# Patient Record
Sex: MALE | Race: WHITE | NOT HISPANIC OR LATINO | Employment: FULL TIME | URBAN - METROPOLITAN AREA
[De-identification: names, ages, dates, MRNs, and addresses within clinical notes are randomized per-mention and may not be internally consistent; named-entity substitution may affect disease eponyms.]

---

## 2023-05-03 ENCOUNTER — TELEPHONE (OUTPATIENT)
Dept: PRIMARY CARE | Facility: CLINIC | Age: 38
End: 2023-05-03

## 2023-05-03 DIAGNOSIS — F32.5 MAJOR DEPRESSIVE DISORDER IN FULL REMISSION, UNSPECIFIED WHETHER RECURRENT (CMS-HCC): ICD-10-CM

## 2023-05-03 DIAGNOSIS — F32.5 MAJOR DEPRESSIVE DISORDER IN FULL REMISSION, UNSPECIFIED WHETHER RECURRENT (CMS-HCC): Primary | ICD-10-CM

## 2023-05-03 RX ORDER — DULOXETIN HYDROCHLORIDE 30 MG/1
30 CAPSULE, DELAYED RELEASE ORAL DAILY
Qty: 90 CAPSULE | Refills: 3 | Status: SHIPPED | OUTPATIENT
Start: 2023-05-03 | End: 2023-05-03 | Stop reason: SDUPTHER

## 2023-05-03 RX ORDER — DULOXETIN HYDROCHLORIDE 30 MG/1
30 CAPSULE, DELAYED RELEASE ORAL DAILY
Qty: 90 CAPSULE | Refills: 3 | Status: SHIPPED | OUTPATIENT
Start: 2023-05-03 | End: 2024-03-27 | Stop reason: SDUPTHER

## 2023-05-03 NOTE — TELEPHONE ENCOUNTER
"Pt called rx line at [id number] requesting that his cymbalta rx go to another pharmacy... \"[facility]\"????    Please call pt to confirm pharmacy, med was sent to [facility]  "

## 2023-11-29 ENCOUNTER — TELEMEDICINE (OUTPATIENT)
Dept: PRIMARY CARE | Facility: CLINIC | Age: 38
End: 2023-11-29
Payer: COMMERCIAL

## 2023-11-29 DIAGNOSIS — N46.9 INFERTILITY MALE: Primary | ICD-10-CM

## 2023-11-29 PROCEDURE — 99213 OFFICE O/P EST LOW 20 MIN: CPT | Performed by: FAMILY MEDICINE

## 2023-11-29 NOTE — PROGRESS NOTES
Subjective   Patient ID: Nghia Hubbard is a 38 y.o. male who presents for No chief complaint on file..  HPI    Patient presents because he and his wife have not been on to get pregnant even though they have been trying for months.  She was on birth control and has been off it for 6-month now.  No other issues at all.    This is a videochat visit.  Obtained consent from patient to have a video chat.  Total time spent in video chat was 8 mins.    Physical exam:  GEN: well appearing patient, no acute distress  HEENT: normal appearing sclera   NEURO: CN appear intact grossly  PSYCH: answering questions appropriately, normal mood      There is no problem list on file for this patient.      Social Connections: Not on file       Current Outpatient Medications on File Prior to Visit   Medication Sig Dispense Refill    DULoxetine (Cymbalta) 30 mg DR capsule Take 1 capsule (30 mg) by mouth once daily. Do not crush or chew. 90 capsule 3     No current facility-administered medications on file prior to visit.        There were no vitals filed for this visit.  There were no vitals filed for this visit.    Review of Systems    Objective     Physical Exam    No visits with results within 2 Month(s) from this visit.   Latest known visit with results is:   No results found for any previous visit.       Assessment/Plan   Problem List Items Addressed This Visit    None  Visit Diagnoses         Codes    Infertility male    -  Primary N46.9    Relevant Orders    POCT Semen Analysis Complete with Strict Morphology          Ordering semen analysis for patient.

## 2023-12-14 ENCOUNTER — ANCILLARY PROCEDURE (OUTPATIENT)
Dept: ENDOCRINOLOGY | Facility: CLINIC | Age: 38
End: 2023-12-14
Payer: COMMERCIAL

## 2023-12-14 DIAGNOSIS — N46.9 INFERTILITY MALE: ICD-10-CM

## 2023-12-14 LAB
% EX RESIDUAL CYTOPLASM (SEMEN): 0 %
% HEAD DEFECTS (SEMEN): 96 %
% NECK MIDPIECE (SEMEN): 8.8 %
% NORMAL (SEMEN): 4.3 % (ref 4–?)
% TAIL DEFECTS (SEMEN): 5 %
ABSTINENCE (DAYS): 3 DAYS (ref 2–7)
AGGLUTINATION (SEMEN): NO
ANALYZED TIME:: NORMAL
ANDROLOGY LAB ID#: NORMAL
CLUMPS (SEMEN): YES
COLLECTED COMPLETELY: YES
COLLECTION LOCATION:: NORMAL
COLLECTION METHOD:: NORMAL
CONCENTRATION(SEMEN): 135.33 MILL/ML (ref 15–?)
DEBRIS (SEMEN): YES
LEUKOCYTE (SEMEN): NEGATIVE
NON PROG. MOTILITY (SEMEN): 2 %
PROG. MOTILITY (SEMEN): 51 % (ref 32–?)
RECEIVED TIME:: NORMAL
SEMEN APPEARANCE: NORMAL
SEMEN LIQUEFACTION: NORMAL
SEMEN VISCOSITY: NORMAL
TOT. NO OF NORM. MOTILE SPERM (SEMEN): 12.08 MILL
TOT. NO OF NORM. SPERM (SEMEN): 6.42 MILL
TOTAL MOTILITY (SEMEN): 53 % (ref 40–?)
TOTAL NO OF MOTILE (SEMEN): 151.07 MILL
TOTAL NO OF RND CELLS (SEMEN): 1.3 MILL (ref ?–5)
TOTAL NO OF SPERM (SEMEN): 284.2 MILL (ref 39–?)
VOLUME (SEMEN): 2.1 ML (ref 1.5–?)

## 2023-12-14 PROCEDURE — 89322 SEMEN ANAL STRICT CRITERIA: CPT | Performed by: OBSTETRICS & GYNECOLOGY

## 2024-03-27 ENCOUNTER — TELEPHONE (OUTPATIENT)
Dept: PRIMARY CARE | Facility: CLINIC | Age: 39
End: 2024-03-27
Payer: COMMERCIAL

## 2024-03-27 DIAGNOSIS — F32.5 MAJOR DEPRESSIVE DISORDER IN FULL REMISSION, UNSPECIFIED WHETHER RECURRENT (CMS-HCC): ICD-10-CM

## 2024-03-27 RX ORDER — DULOXETIN HYDROCHLORIDE 30 MG/1
30 CAPSULE, DELAYED RELEASE ORAL DAILY
Qty: 90 CAPSULE | Refills: 0 | Status: SHIPPED | OUTPATIENT
Start: 2024-03-27 | End: 2024-03-28 | Stop reason: SDUPTHER

## 2024-03-27 RX ORDER — BUPROPION HYDROCHLORIDE 150 MG/1
150 TABLET ORAL DAILY
Qty: 90 TABLET | Refills: 0 | Status: SHIPPED | OUTPATIENT
Start: 2024-03-27 | End: 2024-03-28 | Stop reason: SDUPTHER

## 2024-03-27 RX ORDER — BUPROPION HYDROCHLORIDE 150 MG/1
150 TABLET ORAL DAILY
COMMUNITY
Start: 2015-07-08 | End: 2024-03-27 | Stop reason: SDUPTHER

## 2024-03-27 NOTE — TELEPHONE ENCOUNTER
Pt called rx line @ 330 his Duloxetine and Wellbutrin was sent to [facility] but he's asking if they can be resent to [facility] instead. MONSE

## 2024-03-27 NOTE — TELEPHONE ENCOUNTER
Pt scheduled for 4/2 but will be out of duloxetine and bupropion (not found in chart) before appointment. Are these okay to fill before 4/2 appointment. Last ov 11/29

## 2024-03-28 DIAGNOSIS — F32.5 MAJOR DEPRESSIVE DISORDER IN FULL REMISSION, UNSPECIFIED WHETHER RECURRENT (CMS-HCC): ICD-10-CM

## 2024-03-28 RX ORDER — DULOXETIN HYDROCHLORIDE 30 MG/1
30 CAPSULE, DELAYED RELEASE ORAL DAILY
Qty: 90 CAPSULE | Refills: 0 | Status: SHIPPED | OUTPATIENT
Start: 2024-03-28 | End: 2024-04-02 | Stop reason: SDUPTHER

## 2024-03-28 RX ORDER — BUPROPION HYDROCHLORIDE 150 MG/1
150 TABLET ORAL DAILY
Qty: 90 TABLET | Refills: 0 | Status: SHIPPED | OUTPATIENT
Start: 2024-03-28 | End: 2024-04-02 | Stop reason: SDUPTHER

## 2024-03-28 NOTE — PROGRESS NOTES
Subjective   Patient ID: Nghia Hubbard is a 38 y.o. male who presents for No chief complaint on file..  HPI    There is no problem list on file for this patient.      Social Connections: Not on file       Current Outpatient Medications on File Prior to Visit   Medication Sig Dispense Refill    buPROPion XL (Wellbutrin XL) 150 mg 24 hr tablet Take 1 tablet (150 mg) by mouth early in the morning.. 90 tablet 0    DULoxetine (Cymbalta) 30 mg DR capsule Take 1 capsule (30 mg) by mouth once daily. Do not crush or chew. 90 capsule 0    [DISCONTINUED] buPROPion XL (Wellbutrin XL) 150 mg 24 hr tablet Take 1 tablet (150 mg) by mouth early in the morning..      [DISCONTINUED] DULoxetine (Cymbalta) 30 mg DR capsule Take 1 capsule (30 mg) by mouth once daily. Do not crush or chew. 90 capsule 3     No current facility-administered medications on file prior to visit.        There were no vitals filed for this visit.  There were no vitals filed for this visit.    Review of Systems    Objective     Physical Exam    No visits with results within 2 Month(s) from this visit.   Latest known visit with results is:   Ancillary Procedure on 12/14/2023   Component Date Value Ref Range Status    Andrology Lab ID# 12/14/2023 [id number]   Final    Abstinence (days) 12/14/2023 3  2 - 7 days Final    Collected Completely 12/14/2023 Yes   Final    Collection Location 12/14/2023 Center   Final    Collection Method 12/14/2023 Masturbation   Final    Received time 12/14/2023 2:00 PM   Final    Analyzed Time 12/14/2023 2:15 PM   Final    Semen Appearance 12/14/2023 Normal   Final    Semen Viscosity 12/14/2023 Abnormal   Final    Semen Liquefaction 12/14/2023 Normal   Final    Debris (Semen) 12/14/2023 Yes   Final    Clumps (Semen) 12/14/2023 Yes   Final    Agglutination (Semen) 12/14/2023 No   Final    Volume (Semen) 12/14/2023 2.1  1.5 mL Final    Concentration(Semen) 12/14/2023 135.33  15 mill/mL Final    Total Motility (Semen) 12/14/2023 53  40  % Final    Prog. Motility (Semen) 12/14/2023 51  32 % Final    Non Prog. Motility (Semen) 12/14/2023 2  % Final    Total No of Sperm (Semen) 12/14/2023 284.20  39 mill Final    Total No of Motile (Semen) 12/14/2023 151.07  mill Final    Total No of Rnd Cells (Semen) 12/14/2023 1.3  5 mill Final    Leukocyte (Semen) 12/14/2023 Negative   Final    % Normal (Semen) 12/14/2023 4.3  4 % Final    % Head defects (Semen) 12/14/2023 96.0  % Final    % Neck Midpiece (Semen) 12/14/2023 8.8  % Final    % Tail defects (Semen) 12/14/2023 5.0  % Final    % Ex Residual Cytoplasm (Semen) 12/14/2023 0.0  % Final    Tot. No of Norm. Motile Sperm (Esther* 12/14/2023 12.079  mill Final    Tot. No of Norm. Sperm (Semen) 12/14/2023 6.420  mill Final       Assessment/Plan

## 2024-04-02 ENCOUNTER — OFFICE VISIT (OUTPATIENT)
Dept: PRIMARY CARE | Facility: CLINIC | Age: 39
End: 2024-04-02
Payer: COMMERCIAL

## 2024-04-02 VITALS
DIASTOLIC BLOOD PRESSURE: 62 MMHG | WEIGHT: 195 LBS | SYSTOLIC BLOOD PRESSURE: 94 MMHG | OXYGEN SATURATION: 95 % | HEIGHT: 73 IN | BODY MASS INDEX: 25.84 KG/M2 | TEMPERATURE: 97.4 F | HEART RATE: 75 BPM

## 2024-04-02 DIAGNOSIS — Z00.00 ROUTINE ADULT HEALTH MAINTENANCE: Primary | ICD-10-CM

## 2024-04-02 DIAGNOSIS — F32.5 MAJOR DEPRESSIVE DISORDER IN FULL REMISSION, UNSPECIFIED WHETHER RECURRENT (CMS-HCC): ICD-10-CM

## 2024-04-02 DIAGNOSIS — N46.9 INFERTILITY MALE: ICD-10-CM

## 2024-04-02 PROCEDURE — 99213 OFFICE O/P EST LOW 20 MIN: CPT | Performed by: FAMILY MEDICINE

## 2024-04-02 PROCEDURE — 1036F TOBACCO NON-USER: CPT | Performed by: FAMILY MEDICINE

## 2024-04-02 PROCEDURE — 99395 PREV VISIT EST AGE 18-39: CPT | Performed by: FAMILY MEDICINE

## 2024-04-02 RX ORDER — BUPROPION HYDROCHLORIDE 150 MG/1
150 TABLET ORAL DAILY
Qty: 90 TABLET | Refills: 3 | Status: SHIPPED | OUTPATIENT
Start: 2024-04-02 | End: 2024-04-05 | Stop reason: SDUPTHER

## 2024-04-02 RX ORDER — DULOXETIN HYDROCHLORIDE 30 MG/1
30 CAPSULE, DELAYED RELEASE ORAL DAILY
Qty: 90 CAPSULE | Refills: 3 | Status: SHIPPED | OUTPATIENT
Start: 2024-04-02 | End: 2025-03-28

## 2024-04-02 NOTE — PROGRESS NOTES
Subjective   Patient ID: Nghia Hubbard is a 38 y.o. male who presents for Annual Exam.  HPI  Presents for CPE.  He is doing ok.  Mood is fair.  Struggling with infertility with his wife.  Exercising more.  Weight is down a bit.  Taking his meds.     Patient Active Problem List   Diagnosis    Depressive disorder    Generalized anxiety disorder       Social Connections: Not on file       Current Outpatient Medications on File Prior to Visit   Medication Sig Dispense Refill    buPROPion XL (Wellbutrin XL) 150 mg 24 hr tablet Take 1 tablet (150 mg) by mouth early in the morning.. 90 tablet 0    DULoxetine (Cymbalta) 30 mg DR capsule Take 1 capsule (30 mg) by mouth once daily. Do not crush or chew. 90 capsule 0    [DISCONTINUED] buPROPion XL (Wellbutrin XL) 150 mg 24 hr tablet Take 1 tablet (150 mg) by mouth early in the morning..      [DISCONTINUED] buPROPion XL (Wellbutrin XL) 150 mg 24 hr tablet Take 1 tablet (150 mg) by mouth early in the morning.. 90 tablet 0    [DISCONTINUED] DULoxetine (Cymbalta) 30 mg DR capsule Take 1 capsule (30 mg) by mouth once daily. Do not crush or chew. 90 capsule 3    [DISCONTINUED] DULoxetine (Cymbalta) 30 mg DR capsule Take 1 capsule (30 mg) by mouth once daily. Do not crush or chew. 90 capsule 0     No current facility-administered medications on file prior to visit.        Vitals:    04/02/24 1359   BP: 94/62   Pulse: 75   Temp: 36.3 °C (97.4 °F)   SpO2: 95%     Vitals:    04/02/24 1359   Weight: 88.5 kg (195 lb)       Review of Systems    Objective     Physical Exam    No visits with results within 2 Month(s) from this visit.   Latest known visit with results is:   Ancillary Procedure on 12/14/2023   Component Date Value Ref Range Status    Andrology Lab ID# 12/14/2023 [id number]   Final    Abstinence (days) 12/14/2023 3  2 - 7 days Final    Collected Completely 12/14/2023 Yes   Final    Collection Location 12/14/2023 Center   Final    Collection Method 12/14/2023 Masturbation    Final    Received time 12/14/2023 2:00 PM   Final    Analyzed Time 12/14/2023 2:15 PM   Final    Semen Appearance 12/14/2023 Normal   Final    Semen Viscosity 12/14/2023 Abnormal   Final    Semen Liquefaction 12/14/2023 Normal   Final    Debris (Semen) 12/14/2023 Yes   Final    Clumps (Semen) 12/14/2023 Yes   Final    Agglutination (Semen) 12/14/2023 No   Final    Volume (Semen) 12/14/2023 2.1  1.5 mL Final    Concentration(Semen) 12/14/2023 135.33  15 mill/mL Final    Total Motility (Semen) 12/14/2023 53  40 % Final    Prog. Motility (Semen) 12/14/2023 51  32 % Final    Non Prog. Motility (Semen) 12/14/2023 2  % Final    Total No of Sperm (Semen) 12/14/2023 284.20  39 mill Final    Total No of Motile (Semen) 12/14/2023 151.07  mill Final    Total No of Rnd Cells (Semen) 12/14/2023 1.3  5 mill Final    Leukocyte (Semen) 12/14/2023 Negative   Final    % Normal (Semen) 12/14/2023 4.3  4 % Final    % Head defects (Semen) 12/14/2023 96.0  % Final    % Neck Midpiece (Semen) 12/14/2023 8.8  % Final    % Tail defects (Semen) 12/14/2023 5.0  % Final    % Ex Residual Cytoplasm (Semen) 12/14/2023 0.0  % Final    Tot. No of Norm. Motile Sperm (Esther* 12/14/2023 12.079  mill Final    Tot. No of Norm. Sperm (Semen) 12/14/2023 6.420  mill Final       Assessment/Plan   Problem List Items Addressed This Visit    None  Visit Diagnoses         Codes    Routine adult health maintenance    -  Primary Z00.00    Relevant Orders    Lipid Panel    Comprehensive Metabolic Panel    CBC and Auto Differential    Testosterone, total and free    Major depressive disorder in full remission, unspecified whether recurrent (CMS/HCC)     F32.5    Relevant Medications    buPROPion XL (Wellbutrin XL) 150 mg 24 hr tablet    DULoxetine (Cymbalta) 30 mg DR capsule    Other Relevant Orders    Lipid Panel    Comprehensive Metabolic Panel    CBC and Auto Differential    Testosterone, total and free    Infertility male     N46.9          Refilled meds.   Take  supplements for sperm health.  Fu in 12 mo

## 2024-04-05 DIAGNOSIS — F32.5 MAJOR DEPRESSIVE DISORDER IN FULL REMISSION, UNSPECIFIED WHETHER RECURRENT (CMS-HCC): ICD-10-CM

## 2024-04-05 RX ORDER — BUPROPION HYDROCHLORIDE 150 MG/1
150 TABLET ORAL DAILY
Qty: 30 TABLET | Refills: 0 | Status: SHIPPED | OUTPATIENT
Start: 2024-04-05 | End: 2024-04-30

## 2024-04-05 NOTE — TELEPHONE ENCOUNTER
Pt called Rx line stating that he got pended medication filled through [facility] and it has not arrived yet and won't until next week. Pt is asking if med can be filled with [facility] for a short supply until he receives med, please advise. [facility] pt and pt is out.

## 2024-04-30 DIAGNOSIS — F32.5 MAJOR DEPRESSIVE DISORDER IN FULL REMISSION, UNSPECIFIED WHETHER RECURRENT (CMS-HCC): ICD-10-CM

## 2024-04-30 RX ORDER — BUPROPION HYDROCHLORIDE 150 MG/1
TABLET ORAL
Qty: 90 TABLET | Refills: 3 | Status: SHIPPED | OUTPATIENT
Start: 2024-04-30

## 2024-04-30 RX ORDER — BUPROPION HYDROCHLORIDE 150 MG/1
150 TABLET ORAL DAILY
Qty: 90 TABLET | Refills: 3 | Status: SHIPPED | OUTPATIENT
Start: 2024-04-30

## 2024-04-30 NOTE — TELEPHONE ENCOUNTER
Pt called rx line at 855a requesting a 90d rx refill on pended med    Pt has no upcoming appts  Pt compliant  Pt uses [facility]x

## 2025-03-26 ENCOUNTER — APPOINTMENT (OUTPATIENT)
Dept: ENDOCRINOLOGY | Facility: CLINIC | Age: 40
End: 2025-03-26
Payer: COMMERCIAL

## 2025-04-10 DIAGNOSIS — F32.5 MAJOR DEPRESSIVE DISORDER IN FULL REMISSION, UNSPECIFIED WHETHER RECURRENT: ICD-10-CM

## 2025-04-10 RX ORDER — BUPROPION HYDROCHLORIDE 150 MG/1
150 TABLET ORAL DAILY
Qty: 90 TABLET | Refills: 0 | Status: SHIPPED | OUTPATIENT
Start: 2025-04-10

## 2025-04-10 RX ORDER — DULOXETIN HYDROCHLORIDE 30 MG/1
30 CAPSULE, DELAYED RELEASE ORAL DAILY
Qty: 90 CAPSULE | Refills: 0 | Status: SHIPPED | OUTPATIENT
Start: 2025-04-10 | End: 2026-04-05

## 2025-04-10 NOTE — TELEPHONE ENCOUNTER
Pt will be short a few days of his rx, requesting rf.    Medication Name:   Duloxetine   Bupropion  Pharmacy Name: [facility]  Last OV: 4/2/24  Next OV: 4/24/25

## 2025-04-24 ENCOUNTER — TELEPHONE (OUTPATIENT)
Dept: PRIMARY CARE | Facility: CLINIC | Age: 40
End: 2025-04-24

## 2025-04-24 ENCOUNTER — APPOINTMENT (OUTPATIENT)
Dept: PRIMARY CARE | Facility: CLINIC | Age: 40
End: 2025-04-24
Payer: COMMERCIAL

## 2025-04-24 VITALS
OXYGEN SATURATION: 99 % | SYSTOLIC BLOOD PRESSURE: 122 MMHG | DIASTOLIC BLOOD PRESSURE: 84 MMHG | HEART RATE: 80 BPM | TEMPERATURE: 97.5 F | BODY MASS INDEX: 25.76 KG/M2 | WEIGHT: 190.2 LBS | HEIGHT: 72 IN

## 2025-04-24 DIAGNOSIS — F41.9 ANXIETY: ICD-10-CM

## 2025-04-24 DIAGNOSIS — Z00.00 ROUTINE ADULT HEALTH MAINTENANCE: Primary | ICD-10-CM

## 2025-04-24 DIAGNOSIS — F32.5 MAJOR DEPRESSIVE DISORDER IN FULL REMISSION, UNSPECIFIED WHETHER RECURRENT: ICD-10-CM

## 2025-04-24 PROCEDURE — 99213 OFFICE O/P EST LOW 20 MIN: CPT | Performed by: FAMILY MEDICINE

## 2025-04-24 PROCEDURE — 99396 PREV VISIT EST AGE 40-64: CPT | Performed by: FAMILY MEDICINE

## 2025-04-24 PROCEDURE — 3008F BODY MASS INDEX DOCD: CPT | Performed by: FAMILY MEDICINE

## 2025-04-24 PROCEDURE — 1036F TOBACCO NON-USER: CPT | Performed by: FAMILY MEDICINE

## 2025-04-24 RX ORDER — BUPROPION HYDROCHLORIDE 150 MG/1
150 TABLET ORAL DAILY
Qty: 90 TABLET | Refills: 3 | Status: SHIPPED | OUTPATIENT
Start: 2025-04-24 | End: 2025-04-24 | Stop reason: SDUPTHER

## 2025-04-24 RX ORDER — DULOXETIN HYDROCHLORIDE 30 MG/1
30 CAPSULE, DELAYED RELEASE ORAL DAILY
Qty: 90 CAPSULE | Refills: 3 | Status: SHIPPED | OUTPATIENT
Start: 2025-04-24 | End: 2025-04-24 | Stop reason: SDUPTHER

## 2025-04-24 RX ORDER — DIAZEPAM 2 MG/1
2 TABLET ORAL NIGHTLY PRN
Qty: 6 TABLET | Refills: 0 | Status: SHIPPED | OUTPATIENT
Start: 2025-04-24 | End: 2025-04-26 | Stop reason: SDUPTHER

## 2025-04-24 RX ORDER — DULOXETIN HYDROCHLORIDE 30 MG/1
30 CAPSULE, DELAYED RELEASE ORAL DAILY
Qty: 90 CAPSULE | Refills: 3 | Status: SHIPPED | OUTPATIENT
Start: 2025-04-24 | End: 2026-04-19

## 2025-04-24 RX ORDER — BUPROPION HYDROCHLORIDE 150 MG/1
150 TABLET ORAL DAILY
Qty: 90 TABLET | Refills: 3 | Status: SHIPPED | OUTPATIENT
Start: 2025-04-24

## 2025-04-24 RX ORDER — DIAZEPAM 2 MG/1
2 TABLET ORAL NIGHTLY PRN
Qty: 6 TABLET | Refills: 0 | Status: SHIPPED | OUTPATIENT
Start: 2025-04-24 | End: 2025-04-24 | Stop reason: SDUPTHER

## 2025-04-24 ASSESSMENT — PATIENT HEALTH QUESTIONNAIRE - PHQ9
SUM OF ALL RESPONSES TO PHQ9 QUESTIONS 1 AND 2: 0
2. FEELING DOWN, DEPRESSED OR HOPELESS: NOT AT ALL
1. LITTLE INTEREST OR PLEASURE IN DOING THINGS: NOT AT ALL

## 2025-04-24 NOTE — PROGRESS NOTES
Subjective   Patient ID: Nghia Hubbard is a 39 y.o. male who presents for Annual Exam (CPE).  HPI  Presents for CPE.  He is doing ok.  Mood is fair.  Struggling with infertility with his wife.  Exercising more.  Weight is down a bit.  Taking his meds.     Family History[1]      Social History     Socioeconomic History    Marital status:      Spouse name: Not on file    Number of children: Not on file    Years of education: Not on file    Highest education level: Not on file   Occupational History    Not on file   Tobacco Use    Smoking status: Never    Smokeless tobacco: Never   Substance and Sexual Activity    Alcohol use: Not on file    Drug use: Not on file    Sexual activity: Not on file   Other Topics Concern    Not on file   Social History Narrative    Not on file     Social Drivers of Health     Financial Resource Strain: Not on file   Food Insecurity: Not on file   Transportation Needs: Not on file   Physical Activity: Not on file   Stress: Not on file   Social Connections: Not on file   Intimate Partner Violence: Not on file   Housing Stability: Not on file         Patient Active Problem List   Diagnosis    Depressive disorder    Generalized anxiety disorder       Social Connections: Not on file       Current Outpatient Medications on File Prior to Visit   Medication Sig Dispense Refill    buPROPion XL (Wellbutrin XL) 150 mg 24 hr tablet Take 1 tablet (150 mg) by mouth early in the morning.. 90 tablet 0    DULoxetine (Cymbalta) 30 mg DR capsule Take 1 capsule (30 mg) by mouth once daily. Do not crush or chew. 90 capsule 0    [DISCONTINUED] buPROPion XL (Wellbutrin XL) 150 mg 24 hr tablet Take 1 tablet by mouth early in the morning. 90 tablet 3     No current facility-administered medications on file prior to visit.        Vitals:    04/24/25 0912   BP: 122/84   Pulse: 80   Temp: 36.4 °C (97.5 °F)   SpO2: 99%     Vitals:    04/24/25 0912   Weight: 86.3 kg (190 lb 3.2 oz)       Review of Systems    All other systems reviewed and are negative.      Objective     Physical Exam  Vitals reviewed.   Constitutional:       General: He is not in acute distress.     Appearance: Normal appearance. He is well-developed. He is not diaphoretic.   HENT:      Head: Normocephalic and atraumatic.      Right Ear: Tympanic membrane normal.      Left Ear: Tympanic membrane normal.      Nose: Nose normal.      Mouth/Throat:      Mouth: Mucous membranes are moist.   Eyes:      Pupils: Pupils are equal, round, and reactive to light.   Cardiovascular:      Rate and Rhythm: Normal rate and regular rhythm.      Heart sounds: Normal heart sounds. No murmur heard.     No friction rub. No gallop.   Pulmonary:      Effort: Pulmonary effort is normal.      Breath sounds: Normal breath sounds. No rales.   Abdominal:      General: Bowel sounds are normal.      Palpations: Abdomen is soft.      Tenderness: There is no abdominal tenderness.   Musculoskeletal:      Cervical back: Normal range of motion and neck supple.   Skin:     General: Skin is warm and dry.   Neurological:      Mental Status: He is alert.   Psychiatric:         Mood and Affect: Mood normal.         No visits with results within 2 Month(s) from this visit.   Latest known visit with results is:   Ancillary Procedure on 12/14/2023   Component Date Value Ref Range Status    Andrology Lab ID# 12/14/2023 [id number]   Final    Abstinence (days) 12/14/2023 3  2 - 7 days Final    Collected Completely 12/14/2023 Yes   Final    Collection Location 12/14/2023 Center   Final    Collection Method 12/14/2023 Masturbation   Final    Received time 12/14/2023 2:00 PM   Final    Analyzed Time 12/14/2023 2:15 PM   Final    Semen Appearance 12/14/2023 Normal   Final    Semen Viscosity 12/14/2023 Abnormal   Final    Semen Liquefaction 12/14/2023 Normal   Final    Debris (Semen) 12/14/2023 Yes   Final    Clumps (Semen) 12/14/2023 Yes   Final    Agglutination (Semen) 12/14/2023 No   Final     Volume (Semen) 12/14/2023 2.1  >=1.5 mL Final    Concentration(Semen) 12/14/2023 135.33  >=15 mill/mL Final    Total Motility (Semen) 12/14/2023 53  >=40 % Final    Prog. Motility (Semen) 12/14/2023 51  >=32 % Final    Non Prog. Motility (Semen) 12/14/2023 2  % Final    Total No of Sperm (Semen) 12/14/2023 284.20  >=39 mill Final    Total No of Motile (Semen) 12/14/2023 151.07  mill Final    Total No of Rnd Cells (Semen) 12/14/2023 1.3  <=5 mill Final    Leukocyte (Semen) 12/14/2023 Negative   Final    % Normal (Semen) 12/14/2023 4.3  >=4 % Final    % Head defects (Semen) 12/14/2023 96.0  % Final    % Neck Midpiece (Semen) 12/14/2023 8.8  % Final    % Tail defects (Semen) 12/14/2023 5.0  % Final    % Ex Residual Cytoplasm (Semen) 12/14/2023 0.0  % Final    Tot. No of Norm. Motile Sperm (Esther* 12/14/2023 12.079  mill Final    Tot. No of Norm. Sperm (Semen) 12/14/2023 6.420  mill Final       Assessment/Plan   Problem List Items Addressed This Visit    None  Visit Diagnoses         Codes      Routine adult health maintenance    -  Primary Z00.00      Major depressive disorder in full remission, unspecified whether recurrent     F32.5          Refilled meds.   Fu in 12 mo       [1] No family history on file.

## 2025-04-24 NOTE — TELEPHONE ENCOUNTER
Dawood from pharmacy called stating for the diazepam it needs to say how long it should last since its a controlled medication.

## 2025-04-25 ENCOUNTER — TELEPHONE (OUTPATIENT)
Dept: PRIMARY CARE | Facility: CLINIC | Age: 40
End: 2025-04-25
Payer: COMMERCIAL

## 2025-04-25 DIAGNOSIS — F41.9 ANXIETY: ICD-10-CM

## 2025-04-25 NOTE — TELEPHONE ENCOUNTER
Alexandrea from [facility] called regarding pt's diazepam. They need to have a day supply on rx  Please update and resend  [facility] out today  Thanks. GIL

## 2025-04-26 RX ORDER — DIAZEPAM 2 MG/1
2 TABLET ORAL EVERY 8 HOURS PRN
Qty: 6 TABLET | Refills: 0 | Status: SHIPPED | OUTPATIENT
Start: 2025-04-26 | End: 2025-05-03

## 2025-08-21 ENCOUNTER — APPOINTMENT (OUTPATIENT)
Dept: PRIMARY CARE | Facility: CLINIC | Age: 40
End: 2025-08-21
Payer: COMMERCIAL

## 2025-08-21 DIAGNOSIS — K58.0 IRRITABLE BOWEL SYNDROME WITH DIARRHEA: Primary | ICD-10-CM

## 2025-08-21 PROCEDURE — 1036F TOBACCO NON-USER: CPT | Performed by: FAMILY MEDICINE

## 2025-08-21 PROCEDURE — 99214 OFFICE O/P EST MOD 30 MIN: CPT | Performed by: FAMILY MEDICINE

## 2025-08-25 ENCOUNTER — APPOINTMENT (OUTPATIENT)
Dept: PRIMARY CARE | Facility: CLINIC | Age: 40
End: 2025-08-25
Payer: COMMERCIAL

## 2026-04-27 ENCOUNTER — APPOINTMENT (OUTPATIENT)
Dept: PRIMARY CARE | Facility: CLINIC | Age: 41
End: 2026-04-27
Payer: COMMERCIAL